# Patient Record
Sex: MALE | Race: WHITE | Employment: UNEMPLOYED | URBAN - METROPOLITAN AREA
[De-identification: names, ages, dates, MRNs, and addresses within clinical notes are randomized per-mention and may not be internally consistent; named-entity substitution may affect disease eponyms.]

---

## 2020-01-27 ENCOUNTER — HOSPITAL ENCOUNTER (EMERGENCY)
Facility: CLINIC | Age: 45
Discharge: HOME | End: 2020-01-27
Attending: PSYCHIATRY & NEUROLOGY | Admitting: PSYCHIATRY & NEUROLOGY

## 2020-01-27 VITALS
DIASTOLIC BLOOD PRESSURE: 94 MMHG | HEART RATE: 67 BPM | RESPIRATION RATE: 16 BRPM | OXYGEN SATURATION: 100 % | WEIGHT: 150 LBS | SYSTOLIC BLOOD PRESSURE: 149 MMHG | TEMPERATURE: 98 F

## 2020-01-27 DIAGNOSIS — F15.10 METHAMPHETAMINE ABUSE (H): ICD-10-CM

## 2020-01-27 LAB
AMPHETAMINES UR QL SCN: POSITIVE
BARBITURATES UR QL: NEGATIVE
BENZODIAZ UR QL: NEGATIVE
CANNABINOIDS UR QL SCN: NEGATIVE
COCAINE UR QL: NEGATIVE
ETHANOL UR QL SCN: NEGATIVE
OPIATES UR QL SCN: NEGATIVE

## 2020-01-27 PROCEDURE — 80320 DRUG SCREEN QUANTALCOHOLS: CPT | Performed by: FAMILY MEDICINE

## 2020-01-27 PROCEDURE — 99282 EMERGENCY DEPT VISIT SF MDM: CPT | Mod: Z6 | Performed by: PSYCHIATRY & NEUROLOGY

## 2020-01-27 PROCEDURE — 99283 EMERGENCY DEPT VISIT LOW MDM: CPT | Performed by: PSYCHIATRY & NEUROLOGY

## 2020-01-27 PROCEDURE — 80307 DRUG TEST PRSMV CHEM ANLYZR: CPT | Performed by: FAMILY MEDICINE

## 2020-01-27 ASSESSMENT — ENCOUNTER SYMPTOMS
DYSPHORIC MOOD: 0
FEVER: 0
SHORTNESS OF BREATH: 0
NERVOUS/ANXIOUS: 0
ABDOMINAL PAIN: 0
HALLUCINATIONS: 1

## 2020-01-27 NOTE — ED AVS SNAPSHOT
[facility], [place], Emergency Department  [address]  [facility] [address]  Phone:  [phone]  Fax:  [phone]                                    Deuce Hanson   MRN: [id number]    Department:  [facility], Emergency Department   Date of Visit:  1/27/2020           After Visit Summary Signature Page    I have received my discharge instructions, and my questions have been answered. I have discussed any challenges I see with this plan with the nurse or doctor.    ..........................................................................................................................................  Patient/Patient Representative Signature      ..........................................................................................................................................  Patient Representative Print Name and Relationship to Patient    ..................................................               ................................................  Date                                   Time    ..........................................................................................................................................  Reviewed by Signature/Title    ...................................................              ..............................................  Date                                               Time          22EPIC Rev 08/18

## 2020-01-28 NOTE — DISCHARGE INSTRUCTIONS
Use the numbers given to get a Rule 25 in order to get CD treatment set up to help stop smoking methamphetamine.   [facility] is a location where they have Maltese speaking providers to do this.

## 2020-01-28 NOTE — ED PROVIDER NOTES
History     Chief Complaint   Patient presents with     Hallucinations     seeing people in his house. Smokes meth a little, last use Friday.     The history is provided by the patient, medical records and a relative. A  was used (Sami).     Deuce Hanson is a 44 year old male who comes in due to his hallucinations. He has been thinking others are coming into his house. He also believes his wife might be having them come with some sort of business she is running from her computer.  None of this is true per the family with him (brother, sister and brother in law).  He admits to using meth but states he only uses it on the weekends but has been using it for the last 2 years.  He has never had issues like this until around 2 months ago.  Family questions if he is using more than he states.  He states his last use was 3 days ago.  His utox was positive for methamphetamine.  He is not depressed or anxious.  He denies any suicidal or homicidal thoughts.  He does not think he is hallucinating but feels there really are people coming into his home.        I have reviewed the Medications, Allergies, Past Medical and Surgical History, and Social History in the Epic system.    Review of Systems   Constitutional: Negative for fever.   Respiratory: Negative for shortness of breath.    Cardiovascular: Negative for chest pain.   Gastrointestinal: Negative for abdominal pain.   Psychiatric/Behavioral: Positive for hallucinations. Negative for dysphoric mood, self-injury and suicidal ideas. The patient is not nervous/anxious.    All other systems reviewed and are negative.      Physical Exam   BP: (!) 149/94  Pulse: 67  Temp: 98  F (36.7  C)  Resp: 16  Weight: 68 kg (150 lb)  SpO2: 100 %      Physical Exam  Vitals signs and nursing note reviewed.   Constitutional:       Appearance: Normal appearance. He is well-developed.   Cardiovascular:      Rate and Rhythm: Normal rate and regular rhythm.      Heart  sounds: Normal heart sounds.   Pulmonary:      Effort: Pulmonary effort is normal. No respiratory distress.      Breath sounds: Normal breath sounds.   Neurological:      Mental Status: He is alert and oriented to person, place, and time.   Psychiatric:         Attention and Perception: Attention and perception normal.         Mood and Affect: Mood and affect normal.         Speech: Speech normal.         Behavior: Behavior normal. Behavior is cooperative.         Thought Content: Thought content normal. Thought content is not paranoid or delusional. Thought content does not include homicidal or suicidal ideation. Thought content does not include homicidal or suicidal plan.         Cognition and Memory: Cognition and memory normal.         Judgment: Judgment is inappropriate.      Comments: Deuce is a 45 y/o male who looks his age. He is well groomed with good eye contact.          ED Course        Procedures               Labs Ordered and Resulted from Time of ED Arrival Up to the Time of Departure from the ED   DRUG ABUSE SCREEN 6 CHEM DEP URINE ([facility]) - Abnormal; Notable for the following components:       Result Value    Amphetamine Qual Urine Positive (*)     All other components within normal limits            Assessments & Plan (with Medical Decision Making)   Deuce will be discharged home. He is not an imminent risk to himself or others.  His hallucinations and delusions are meth induced and will improve if he will stop the meth.  Family agrees with this but they are unsure if he will stop on his own.  They were given Rule 25 numbers in order to get into CD treatment with special attention given to [facility] that has Welsh speaking providers.  Family and the patient agree with the plan.     I have reviewed the nursing notes.    I have reviewed the findings, diagnosis, plan and need for follow up with the patient.    New Prescriptions    No medications on file       Final diagnoses:   Methamphetamine abuse  (H)       1/27/2020   [facility], [place], EMERGENCY DEPARTMENT     Conner Coy MD  01/27/20 3682